# Patient Record
Sex: FEMALE | Race: WHITE | NOT HISPANIC OR LATINO | URBAN - METROPOLITAN AREA
[De-identification: names, ages, dates, MRNs, and addresses within clinical notes are randomized per-mention and may not be internally consistent; named-entity substitution may affect disease eponyms.]

---

## 2017-04-10 RX ORDER — NYSTATIN 100000 U/G
OINTMENT TOPICAL 2 TIMES DAILY
COMMUNITY

## 2017-04-10 RX ORDER — TALC 100 %
POWDER (GRAM) MISCELLANEOUS DAILY PRN
COMMUNITY

## 2017-04-10 RX ORDER — SENNOSIDES 8.6 MG
1 CAPSULE ORAL
COMMUNITY

## 2017-04-10 RX ORDER — POLYETHYLENE GLYCOL 3350 17 G/17G
1 POWDER, FOR SOLUTION ORAL DAILY
COMMUNITY

## 2017-04-10 RX ORDER — MUPIROCIN 20 MG/G
OINTMENT TOPICAL 2 TIMES DAILY
COMMUNITY

## 2017-04-10 RX ORDER — BISACODYL 10 MG
10 SUPPOSITORY, RECTAL RECTAL DAILY PRN
COMMUNITY

## 2017-04-12 ENCOUNTER — ANESTHESIA EVENT (OUTPATIENT)
Dept: SURGERY | Facility: CLINIC | Age: 52
End: 2017-04-12
Payer: MEDICARE

## 2017-04-12 ENCOUNTER — HOSPITAL ENCOUNTER (OUTPATIENT)
Facility: CLINIC | Age: 52
Discharge: HOME | End: 2017-04-12
Attending: DENTIST | Admitting: DENTIST
Payer: MEDICARE

## 2017-04-12 ENCOUNTER — ANESTHESIA (OUTPATIENT)
Dept: SURGERY | Facility: CLINIC | Age: 52
End: 2017-04-12
Payer: MEDICARE

## 2017-04-12 VITALS
BODY MASS INDEX: 25.73 KG/M2 | RESPIRATION RATE: 16 BRPM | WEIGHT: 122.58 LBS | TEMPERATURE: 97.9 F | HEIGHT: 58 IN | DIASTOLIC BLOOD PRESSURE: 58 MMHG | SYSTOLIC BLOOD PRESSURE: 95 MMHG | OXYGEN SATURATION: 98 % | HEART RATE: 79 BPM

## 2017-04-12 DIAGNOSIS — G89.18 POST-OPERATIVE PAIN: Primary | ICD-10-CM

## 2017-04-12 LAB
CREAT SERPL-MCNC: 0.89 MG/DL (ref 0.52–1.04)
GFR SERPL CREATININE-BSD FRML MDRD: 66 ML/MIN/1.7M2
HGB BLD-MCNC: 13.1 G/DL (ref 11.7–15.7)

## 2017-04-12 PROCEDURE — 25000132 ZZH RX MED GY IP 250 OP 250 PS 637: Mod: GY | Performed by: DENTIST

## 2017-04-12 PROCEDURE — 25000125 ZZHC RX 250: Performed by: DENTIST

## 2017-04-12 PROCEDURE — 85018 HEMOGLOBIN: CPT | Performed by: ANESTHESIOLOGY

## 2017-04-12 PROCEDURE — 36000053 ZZH SURGERY LEVEL 2 EA 15 ADDTL MIN - UMMC: Performed by: DENTIST

## 2017-04-12 PROCEDURE — 82565 ASSAY OF CREATININE: CPT | Performed by: ANESTHESIOLOGY

## 2017-04-12 PROCEDURE — 71000027 ZZH RECOVERY PHASE 2 EACH 15 MINS: Performed by: DENTIST

## 2017-04-12 PROCEDURE — 25000566 ZZH SEVOFLURANE, EA 15 MIN: Performed by: DENTIST

## 2017-04-12 PROCEDURE — 93010 ELECTROCARDIOGRAM REPORT: CPT | Performed by: INTERNAL MEDICINE

## 2017-04-12 PROCEDURE — 71000014 ZZH RECOVERY PHASE 1 LEVEL 2 FIRST HR: Performed by: DENTIST

## 2017-04-12 PROCEDURE — 37000008 ZZH ANESTHESIA TECHNICAL FEE, 1ST 30 MIN: Performed by: DENTIST

## 2017-04-12 PROCEDURE — 40000065 ZZH STATISTIC EKG NON-CHARGEABLE

## 2017-04-12 PROCEDURE — 40000170 ZZH STATISTIC PRE-PROCEDURE ASSESSMENT II: Performed by: DENTIST

## 2017-04-12 PROCEDURE — 25000128 H RX IP 250 OP 636: Performed by: NURSE ANESTHETIST, CERTIFIED REGISTERED

## 2017-04-12 PROCEDURE — 25000125 ZZHC RX 250: Performed by: NURSE ANESTHETIST, CERTIFIED REGISTERED

## 2017-04-12 PROCEDURE — 37000009 ZZH ANESTHESIA TECHNICAL FEE, EACH ADDTL 15 MIN: Performed by: DENTIST

## 2017-04-12 PROCEDURE — A9270 NON-COVERED ITEM OR SERVICE: HCPCS | Mod: GY | Performed by: DENTIST

## 2017-04-12 PROCEDURE — 36415 COLL VENOUS BLD VENIPUNCTURE: CPT | Performed by: ANESTHESIOLOGY

## 2017-04-12 PROCEDURE — 36000051 ZZH SURGERY LEVEL 2 1ST 30 MIN - UMMC: Performed by: DENTIST

## 2017-04-12 PROCEDURE — P9041 ALBUMIN (HUMAN),5%, 50ML: HCPCS | Performed by: NURSE ANESTHETIST, CERTIFIED REGISTERED

## 2017-04-12 PROCEDURE — 25800025 ZZH RX 258: Performed by: NURSE ANESTHETIST, CERTIFIED REGISTERED

## 2017-04-12 PROCEDURE — 27210794 ZZH OR GENERAL SUPPLY STERILE: Performed by: DENTIST

## 2017-04-12 RX ORDER — CHLORHEXIDINE GLUCONATE ORAL RINSE 1.2 MG/ML
SOLUTION DENTAL PRN
Status: DISCONTINUED | OUTPATIENT
Start: 2017-04-12 | End: 2017-04-12 | Stop reason: HOSPADM

## 2017-04-12 RX ORDER — SODIUM CHLORIDE, SODIUM LACTATE, POTASSIUM CHLORIDE, CALCIUM CHLORIDE 600; 310; 30; 20 MG/100ML; MG/100ML; MG/100ML; MG/100ML
INJECTION, SOLUTION INTRAVENOUS CONTINUOUS
Status: DISCONTINUED | OUTPATIENT
Start: 2017-04-12 | End: 2017-04-12 | Stop reason: HOSPADM

## 2017-04-12 RX ORDER — SODIUM CHLORIDE, SODIUM LACTATE, POTASSIUM CHLORIDE, CALCIUM CHLORIDE 600; 310; 30; 20 MG/100ML; MG/100ML; MG/100ML; MG/100ML
INJECTION, SOLUTION INTRAVENOUS CONTINUOUS PRN
Status: DISCONTINUED | OUTPATIENT
Start: 2017-04-12 | End: 2017-04-12

## 2017-04-12 RX ORDER — PROPOFOL 10 MG/ML
INJECTION, EMULSION INTRAVENOUS PRN
Status: DISCONTINUED | OUTPATIENT
Start: 2017-04-12 | End: 2017-04-12

## 2017-04-12 RX ORDER — NEOSTIGMINE METHYLSULFATE 1 MG/ML
VIAL (ML) INJECTION PRN
Status: DISCONTINUED | OUTPATIENT
Start: 2017-04-12 | End: 2017-04-12

## 2017-04-12 RX ORDER — CHLORHEXIDINE GLUCONATE ORAL RINSE 1.2 MG/ML
10 SOLUTION DENTAL ONCE
Status: DISCONTINUED | OUTPATIENT
Start: 2017-04-12 | End: 2017-04-12 | Stop reason: HOSPADM

## 2017-04-12 RX ORDER — FENTANYL CITRATE 50 UG/ML
INJECTION, SOLUTION INTRAMUSCULAR; INTRAVENOUS PRN
Status: DISCONTINUED | OUTPATIENT
Start: 2017-04-12 | End: 2017-04-12

## 2017-04-12 RX ORDER — ALBUMIN, HUMAN INJ 5% 5 %
SOLUTION INTRAVENOUS CONTINUOUS PRN
Status: DISCONTINUED | OUTPATIENT
Start: 2017-04-12 | End: 2017-04-12

## 2017-04-12 RX ORDER — BACITRACIN ZINC 500 [USP'U]/G
OINTMENT TOPICAL PRN
Status: DISCONTINUED | OUTPATIENT
Start: 2017-04-12 | End: 2017-04-12 | Stop reason: HOSPADM

## 2017-04-12 RX ORDER — ONDANSETRON 2 MG/ML
INJECTION INTRAMUSCULAR; INTRAVENOUS PRN
Status: DISCONTINUED | OUTPATIENT
Start: 2017-04-12 | End: 2017-04-12

## 2017-04-12 RX ORDER — ACETAMINOPHEN 500 MG
500 TABLET ORAL EVERY 4 HOURS PRN
Qty: 24 TABLET | Refills: 0 | Status: SHIPPED | OUTPATIENT
Start: 2017-04-12 | End: 2017-04-16

## 2017-04-12 RX ORDER — EPHEDRINE SULFATE 50 MG/ML
INJECTION, SOLUTION INTRAMUSCULAR; INTRAVENOUS; SUBCUTANEOUS PRN
Status: DISCONTINUED | OUTPATIENT
Start: 2017-04-12 | End: 2017-04-12

## 2017-04-12 RX ORDER — LIDOCAINE HYDROCHLORIDE AND EPINEPHRINE 10; 10 MG/ML; UG/ML
INJECTION, SOLUTION INFILTRATION; PERINEURAL PRN
Status: DISCONTINUED | OUTPATIENT
Start: 2017-04-12 | End: 2017-04-12 | Stop reason: HOSPADM

## 2017-04-12 RX ORDER — GLYCOPYRROLATE 0.2 MG/ML
INJECTION, SOLUTION INTRAMUSCULAR; INTRAVENOUS PRN
Status: DISCONTINUED | OUTPATIENT
Start: 2017-04-12 | End: 2017-04-12

## 2017-04-12 RX ORDER — DEXAMETHASONE SODIUM PHOSPHATE 4 MG/ML
INJECTION, SOLUTION INTRA-ARTICULAR; INTRALESIONAL; INTRAMUSCULAR; INTRAVENOUS; SOFT TISSUE PRN
Status: DISCONTINUED | OUTPATIENT
Start: 2017-04-12 | End: 2017-04-12

## 2017-04-12 RX ADMIN — Medication 5 MG: at 17:23

## 2017-04-12 RX ADMIN — PHENYLEPHRINE HYDROCHLORIDE 50 MCG: 10 INJECTION, SOLUTION INTRAMUSCULAR; INTRAVENOUS; SUBCUTANEOUS at 16:59

## 2017-04-12 RX ADMIN — Medication 5 MG: at 16:53

## 2017-04-12 RX ADMIN — FENTANYL CITRATE 25 MCG: 50 INJECTION, SOLUTION INTRAMUSCULAR; INTRAVENOUS at 17:34

## 2017-04-12 RX ADMIN — DEXAMETHASONE SODIUM PHOSPHATE 8 MG: 4 INJECTION, SOLUTION INTRAMUSCULAR; INTRAVENOUS at 16:06

## 2017-04-12 RX ADMIN — Medication 5 MG: at 17:20

## 2017-04-12 RX ADMIN — Medication 5 MG: at 16:38

## 2017-04-12 RX ADMIN — SODIUM CHLORIDE, POTASSIUM CHLORIDE, SODIUM LACTATE AND CALCIUM CHLORIDE: 600; 310; 30; 20 INJECTION, SOLUTION INTRAVENOUS at 15:39

## 2017-04-12 RX ADMIN — FENTANYL CITRATE 25 MCG: 50 INJECTION, SOLUTION INTRAMUSCULAR; INTRAVENOUS at 17:12

## 2017-04-12 RX ADMIN — ONDANSETRON 4 MG: 2 INJECTION INTRAMUSCULAR; INTRAVENOUS at 17:32

## 2017-04-12 RX ADMIN — Medication 5 MG: at 16:57

## 2017-04-12 RX ADMIN — GLYCOPYRROLATE 0.4 MG: 0.2 INJECTION, SOLUTION INTRAMUSCULAR; INTRAVENOUS at 17:36

## 2017-04-12 RX ADMIN — Medication 30 MG: at 15:47

## 2017-04-12 RX ADMIN — Medication 5 MG: at 16:29

## 2017-04-12 RX ADMIN — SODIUM CHLORIDE, POTASSIUM CHLORIDE, SODIUM LACTATE AND CALCIUM CHLORIDE: 600; 310; 30; 20 INJECTION, SOLUTION INTRAVENOUS at 16:45

## 2017-04-12 RX ADMIN — MIDAZOLAM HYDROCHLORIDE 2 MG: 1 INJECTION, SOLUTION INTRAMUSCULAR; INTRAVENOUS at 15:39

## 2017-04-12 RX ADMIN — PROPOFOL 30 MG: 10 INJECTION, EMULSION INTRAVENOUS at 16:44

## 2017-04-12 RX ADMIN — NEOSTIGMINE METHYLSULFATE 2 MG: 1 INJECTION INTRAMUSCULAR; INTRAVENOUS; SUBCUTANEOUS at 17:36

## 2017-04-12 RX ADMIN — ALBUMIN (HUMAN): 12.5 SOLUTION INTRAVENOUS at 17:12

## 2017-04-12 RX ADMIN — Medication 5 MG: at 16:35

## 2017-04-12 RX ADMIN — PROPOFOL 10 MG: 10 INJECTION, EMULSION INTRAVENOUS at 17:12

## 2017-04-12 RX ADMIN — FENTANYL CITRATE 50 MCG: 50 INJECTION, SOLUTION INTRAMUSCULAR; INTRAVENOUS at 15:45

## 2017-04-12 RX ADMIN — PHENYLEPHRINE HYDROCHLORIDE 50 MCG: 10 INJECTION, SOLUTION INTRAMUSCULAR; INTRAVENOUS; SUBCUTANEOUS at 16:06

## 2017-04-12 RX ADMIN — PHENYLEPHRINE HYDROCHLORIDE 50 MCG: 10 INJECTION, SOLUTION INTRAMUSCULAR; INTRAVENOUS; SUBCUTANEOUS at 17:23

## 2017-04-12 ASSESSMENT — ENCOUNTER SYMPTOMS: SEIZURES: 1

## 2017-04-12 NOTE — IP AVS SNAPSHOT
[address] [address]    Phone:  [phone]                                       After Visit Summary   4/12/2017    Azalia Pimentel    MRN: [id number]           After Visit Summary Signature Page     I have received my discharge instructions, and my questions have been answered. I have discussed any challenges I see with this plan with the nurse or doctor.    ..........................................................................................................................................  Patient/Patient Representative Signature      ..........................................................................................................................................  Patient Representative Print Name and Relationship to Patient    ..................................................               ................................................  Date                                            Time    ..........................................................................................................................................  Reviewed by Signature/Title    ...................................................              ..............................................  Date                                                            Time

## 2017-04-12 NOTE — DISCHARGE INSTRUCTIONS
[facility]  Same-Day Surgery   Adult Discharge Orders & Instructions     For 24 hours after surgery    1. Get plenty of rest.  A responsible adult must stay with you for at least 24 hours after you leave the hospital.   2. Do not drive or use heavy equipment.  If you have weakness or tingling, don't drive or use heavy equipment until this feeling goes away.  3. Do not drink alcohol.  4. Avoid strenuous or risky activities.  Ask for help when climbing stairs.   5. You may feel lightheaded.  IF so, sit for a few minutes before standing.  Have someone help you get up.   6. If you have nausea (feel sick to your stomach): Drink only clear liquids such as apple juice, ginger ale, broth or 7-Up.  Rest may also help.  Be sure to drink enough fluids.  Move to a regular diet as you feel able.  7. You may have a slight fever. Call the doctor if your fever is over 100 F (37.7 C) (taken under the tongue) or lasts longer than 24 hours.  8. You may have a dry mouth, a sore throat, muscle aches or trouble sleeping.  These should go away after 24 hours.  9. Do not make important or legal decisions.   Call your doctor for any of the followin.  Signs of infection (fever, growing tenderness at the surgery site, a large amount of drainage or bleeding, severe pain, foul-smelling drainage, redness, swelling).    2. It has been over 8 to 10 hours since surgery and you are still not able to urinate (pass water).    3.  Headache for over 24 hours.    4.  Numbness, tingling or weakness the day after surgery (if you had spinal anesthesia).  To contact a doctor, call ________________________________________ or:        [phone] and ask for the resident on call for   ______________________________________________ (answered 24 hours a day)      Emergency Department:    [facility]: [phone]       (TTY for hearing impaired: [phone])    [facility]: [phone]       (TTY for  hearing impaired: [phone])

## 2017-04-12 NOTE — ANESTHESIA CARE TRANSFER NOTE
Patient: Azalia Pimentel    Procedure(s):  Dental Exam, Radiographs, Periodontal Therapy, and Extractions x2, Flouride Varnish - Wound Class: II-Clean Contaminated    Diagnosis: Dental Caries, Periodontal Disease   Diagnosis Additional Information: No value filed.    Anesthesia Type:   General     Note:  Airway :Face Mask  Patient transferred to:PACU        Vitals: (Last set prior to Anesthesia Care Transfer)    CRNA VITALS  4/12/2017 1718 - 4/12/2017 1753      4/12/2017             Pulse: 95    Ht Rate: 98    SpO2: 100 %    Resp Rate (observed): (!)  6                Electronically Signed By: BUCKY Talbot CRNA  April 12, 2017  5:53 PM

## 2017-04-12 NOTE — BRIEF OP NOTE
[facility], [place]    Brief Operative Note    Pre-operative diagnosis: Dental Caries, Periodontal Disease   Post-operative diagnosis * No post-op diagnosis entered *  Procedure: Procedure(s):  Dental Exam, Radiographs, Periodontal Therapy, and Extractions x2, Flouride Varnish - Wound Class: II-Clean Contaminated  Surgeon: Surgeon(s) and Role:     * Pratima Cooper DDS - Primary     * Matias Arrieta DDS - Resident - Assisting  Anesthesia: General   Estimated blood loss: Less than 50 ml  Drains: None  Specimens: * No specimens in log *  Findings:   None.  Complications: None.  Implants: None.

## 2017-04-12 NOTE — ANESTHESIA POSTPROCEDURE EVALUATION
Patient: Azalia Pimentel    Procedure(s):  Dental Exam, Radiographs, Periodontal Therapy, and Extractions x2, Flouride Varnish - Wound Class: II-Clean Contaminated    Diagnosis:Dental Caries, Periodontal Disease   Diagnosis Additional Information: No value filed.    Anesthesia Type:  General    Note:  Anesthesia Post Evaluation    Patient location during evaluation: PACU  Patient participation: Able to fully participate in evaluation  Level of consciousness: awake  Pain management: adequate  Airway patency: patent  Cardiovascular status: acceptable  Respiratory status: acceptable  Hydration status: balanced  PONV: none     Anesthetic complications: None          Last vitals:  Vitals:    04/12/17 1753 04/12/17 1800 04/12/17 1815   BP: 106/41 108/52 97/47   Pulse:      Resp: 24 14 13   Temp: 36.6  C (97.9  F)  36.6  C (97.9  F)   SpO2: 100% 100% 99%         Electronically Signed By: Jakub Ansari MD  April 12, 2017  6:50 PM

## 2017-04-12 NOTE — ANESTHESIA PREPROCEDURE EVALUATION
Anesthesia Evaluation     . Pt has had prior anesthetic. Type: General           ROS/MED HX    ENT/Pulmonary:     (+)sleep apnea, uses CPAP , . .    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    Cardiovascular:  - neg cardiovascular ROS       METS/Exercise Tolerance:     Hematologic:         Musculoskeletal:  - neg musculoskeletal ROS       GI/Hepatic: Comment: H/0 dysphagia    (+) GERD       Renal/Genitourinary:     (+) Other Renal/ Genitourinary, h/o pyelonephritis      Endo:  - neg endo ROS       Psychiatric:  - neg psychiatric ROS       Infectious Disease:  - neg infectious disease ROS       Malignancy:         Other: Comment: Postmenopausal                    Physical Exam  Normal systems: cardiovascular and pulmonary    Airway   Mallampati: I  TM distance: >3 FB  Neck ROM: full    Dental     Cardiovascular       Pulmonary     Other findings:   Airway - small oral opening, large neck                Anesthesia Plan      History & Physical Review  History and physical reviewed and following examination; no interval change.    ASA Status:  3 .    NPO Status:  > 8 hours    Plan for General with Propofol induction. Maintenance will be Balanced.    PONV prophylaxis:  Ondansetron (or other 5HT-3) and Dexamethasone or Solumedrol  Additional equipment: Videolaryngoscope               S/P Dental (09/28/2015) GETA without incident.  ETT 6.5 with [facility]      Labs           Cr   0.89         Hg   13.1    EKG - NSR @ 69 bpm      Plan  - GETA with CMAC assistance.    Risks and possible complications of GETA discussed in full with pt's caretaker.  She voices understanding and wishes to proceed.    Dr. Mariela Womack MD  Anesthesia  04/12/2017 @ 1145 am      Postoperative Care  Postoperative pain management:  IV analgesics.      Consents  Anesthetic plan, risks, benefits and alternatives discussed with:   Healthcare Power of ..                          .

## 2017-04-12 NOTE — OR NURSING
Azalia's caregiver said that pt's legal guardian Kristal has a meeting coming up soon and it will be harder to step out of to get phone consent.  Kristal had been notified that dentist would call around 12 but they are running behing.  Dr. Cooper and OR nurse notified and they said would call for phone consent now from the OR.

## 2017-04-12 NOTE — IP AVS SNAPSHOT
MRN:[id number]                      After Visit Summary   4/12/2017    Azalia Pimentel    MRN: [id number]           Thank you!     Thank you for choosing [facility] for your care. Our goal is always to provide you with excellent care. Hearing back from our patients is one way we can continue to improve our services. Please take a few minutes to complete the written survey that you may receive in the mail after you visit with us. Thank you!        Patient Information     Date Of Birth          1965        About your hospital stay     You were admitted on:  April 12, 2017 You last received care in the:  [facility] PACU    You were discharged on:  April 12, 2017       Who to Call     For medical emergencies, please call 911.  For non-urgent questions about your medical care, please call your primary care provider or clinic, [phone]  For questions related to your surgery, please call your surgery clinic        Attending Provider     Provider Pratima Landin DDS Dentist       Primary Care Provider Office Phone # Fax #    Khadra Huynh [phone]       [address] [address]        After Care Instructions     Discharge Instructions       Return to [facility] dental clinic in 6 months for recall and follow up.  Call the dental clinic to schedule the appointment.  Clinic phone: [phone]  Emergency post op care (after business hours and weekends) call: [phone], and ask for the dental resident on call.    Procedures Performed Today (April 12, 2017): Dental exam, dental radiographs, scaling and root planing, prophylaxis, extractions of one molar in the upper right and one in the lower left. No cavities were found. Periodontal abscess was noted associated with a molar in the upper right. Tooth was removed due to extensive bone loss and poor prognosis.  Fluoride varnish was applied.       Post-operative oral  surgery instructions  Care of the mouth following a surgical procedure is essential in the healing process.  There is a certain amount of swelling, discoloration, discomfort and bleeding which can be expected.     Swelling:  Some degree of swelling is normal and can be minimized with the use of ice or cold packs applied to the area for 15-20 minutes and then removed for 15-20 minutes.  This should only be done for the first 24 hours, after 24 hours use a warm moist compress over the area for 15-20 minutes and then remove for 15-20 minutes.  Sit upright and keep your head elevated when sleeping.  Maximum swelling will occur about the second or third post-operative day and then slowly recede. Once present, it can remain swollen for up to 7 days and discomfort may persist for 10 days.    Discomfort:   A variable amount of pain follows extraction and oral surgery procedures. Tylenol, ibuprofen, or any over the counter pain medication can be used. In some cases, prescription pain medication will be given which  should be taken exactly as directed, and taken before the local anesthetic wears off. If pain increases for more than 3 days call the clinic.   Do not take pain medication on an empty stomach as it may cause nausea.     Bleeding:  Some bleeding and oozing is to be expected for several hours.  Avoid spitting, rinsing, swishing, and use of a straw for the next 24-48 hours, as they may provoke oozing.  If bleeding is visible then place a moistened gauze pack over the area and keep firm pressure on the gauze pack for 30 minutes and then discard.  If bleeding is more than slight, use sterile gauze or a moistened tea bag over the area and again apply firm pressure for 30 minutes.     Discoloration:   Facial discoloration (black and blue bruising) often follows many extractions and oral surgery procedures. Discoloration is normal and is no cause for alarm. It may persist for as long as several weeks.    Jaw Stiffness:    For several days following most oral surgical procedures, the jaw may become somewhat stiff. Should jaw stiffness worsen after 2  weeks, call the clinic.    Numbness:    Local anesthetics may be effective for approximately 24-48 hours. If you are numb beyond this time frame, please call the clinic.    Nausea:    Nausea is common after surgery, and it is sometimes caused by pain medicines. Nausea may be reduced by preceding each pill with a small amount of soft food, then taking the pill with a large volume of water. Continue consuming clear fluids and minimize the pain medication. Call if you don t feel better or if vomiting is a problem. Soft drinks that have less carbonation may help with nausea.    Care of the mouth:  Do not rinse your mouth for 24 hours after surgery.  A day following surgery or extraction rinse with warm salt water after each meal or 3-4 times a day (One half teaspoonful of table salt in a full glass of warm water). Resume normal oral hygiene within 24 hours after procedure. Do not brush surgical or extraction site but clean your teeth within the bounds of comfort.   Avoid use of alcohol, smoking, or carbonated drinks for 24-48 hours after surgery.  This may interfere with clot formation and slow the healing process.  Smoking is the primary cause of dry sockets.    Dry Socket:   This is a condition where the wound healing is disturbed or altered after removal of a tooth. This usually occurs within 3-10 days after removal of a tooth. Typically pain will increase and worsen, and may radiate along the jaw and into the ear. Call the clinic if you experience these symptoms.    Diet:  A soft or liquid diet is recommended for the first few days following surgery, advance as tolerated. Until local anesthesia (numbness) wears off, be careful chewing to prevent biting the numb area. Eat soft and liquid foods such as yogurt, cottage cheese, soup etc. Try not to skip a meal, and keep up normal  diet.    Rest:  Rest as much as possible for the next 24 hours, avoiding any excessive amount of physical activity.       Fluoride Varnish:  A 5% sodium fluoride varnish was placed over the teeth for the prevention of dental decay.  The varnish hardens on contact with saliva so the teeth may appear spotty or as if there is a thin film coating the teeth, this is normal.  The varnish should remain on your teeth for at least 4-6 hours for the maximum effect.  It is recommended that you only eat soft foods and drink cold liquids during this time, do not eat or drink anything hot and do not brush your teeth the day of your surgery.  The varnish with naturally wear away, and can be brushed off the next day.     Note:  1. Return to work or school in 24-48 hours  2. Antibiotics may decrease the effectiveness of birth control medications.  Additional methods of birthcontrol should be used while on antibiotics.                  Further instructions from your care team       [facility], [place]  Same-Day Surgery   Adult Discharge Orders & Instructions     For 24 hours after surgery    1. Get plenty of rest.  A responsible adult must stay with you for at least 24 hours after you leave the hospital.   2. Do not drive or use heavy equipment.  If you have weakness or tingling, don't drive or use heavy equipment until this feeling goes away.  3. Do not drink alcohol.  4. Avoid strenuous or risky activities.  Ask for help when climbing stairs.   5. You may feel lightheaded.  IF so, sit for a few minutes before standing.  Have someone help you get up.   6. If you have nausea (feel sick to your stomach): Drink only clear liquids such as apple juice, ginger ale, broth or 7-Up.  Rest may also help.  Be sure to drink enough fluids.  Move to a regular diet as you feel able.  7. You may have a slight fever. Call the doctor if your fever is over 100 F (37.7 C) (taken under the tongue) or lasts longer than 24  "hours.  8. You may have a dry mouth, a sore throat, muscle aches or trouble sleeping.  These should go away after 24 hours.  9. Do not make important or legal decisions.   Call your doctor for any of the followin.  Signs of infection (fever, growing tenderness at the surgery site, a large amount of drainage or bleeding, severe pain, foul-smelling drainage, redness, swelling).    2. It has been over 8 to 10 hours since surgery and you are still not able to urinate (pass water).    3.  Headache for over 24 hours.    4.  Numbness, tingling or weakness the day after surgery (if you had spinal anesthesia).  To contact a doctor, call ________________________________________ or:        [phone] and ask for the resident on call for   ______________________________________________ (answered 24 hours a day)      Emergency Department:    [facility]: [phone]       (TTY for hearing impaired: [phone])    [facility]: [phone]       (TTY for hearing impaired: [phone])        Pending Results     Date and Time Order Name Status Description    2017 1130 EKG 12-lead, tracing only Preliminary             Admission Information     Date & Time Provider Department Dept. Phone    2017 Pratima Cooper, [facility] [place] PACU [phone]      Your Vitals Were     Blood Pressure Pulse Temperature Respirations Height Weight    108/52 79 97.9  F (36.6  C) (Oral) 14 1.473 m (4' 10\") 55.6 kg (122 lb 9.2 oz)    Pulse Oximetry BMI (Body Mass Index)                100% 25.62 kg/m2          MyChart Information     [facility] lets you send messages to your doctor, view your test results, renew your prescriptions, schedule appointments and more. To sign up, go to [email or web address] . Click on \"Log in\" on the left side of the screen, which will take you to the Welcome page. Then click on \"Sign up Now\" on the right side of the page.     You will be asked to enter the access code listed below, " as well as some personal information. Please follow the directions to create your username and password.     Your access code is: [email or web address]  Expires: 2017  6:13 PM     Your access code will  in 90 days. If you need help or a new code, please call your [facility] or [phone].        Care EveryWhere ID     This is your Care EveryWhere ID. This could be used by other organizations to access your [facility] medical records  [id number]           Review of your medicines      START taking        Dose / Directions    acetaminophen 500 MG tablet   Commonly known as:  TYLENOL   Used for:  Post-operative pain        Dose:  500 mg   Take 1 tablet (500 mg) by mouth every 4 hours as needed for other (post operative discomfort)   Quantity:  24 tablet   Refills:  0         CONTINUE these medicines which have NOT CHANGED        Dose / Directions    ascorbic acid 1000 MG Tabs tablet        Dose:  250 mg   Take 250 mg by mouth daily   Refills:  0       ATORVASTATIN CALCIUM PO        Dose:  20 mg   Take 20 mg by mouth daily   Refills:  0       bisacodyl 10 MG Suppository   Commonly known as:  DULCOLAX        Dose:  10 mg   Place 10 mg rectally daily as needed for constipation   Refills:  0       carbamide peroxide 6.5 % otic solution   Commonly known as:  DEBROX        Dose:  5 drop   5 drops once a week   Refills:  0       cetirizine 10 MG tablet   Commonly known as:  zyrTEC        Dose:  10 mg   Take 10 mg by mouth   Refills:  0       Cranberry 500 MG Caps        Take by mouth daily   Refills:  0       levETIRAcetam 750 MG tablet   Commonly known as:  KEPPRA        Dose:  500 mg   Take 500 mg by mouth   Refills:  0       loperamide 2 MG capsule   Commonly known as:  IMODIUM        Refills:  0       mupirocin 2 % ointment   Commonly known as:  BACTROBAN        Apply topically 2 times daily   Refills:  0       nystatin ointment   Commonly known as:  MYCOSTATIN        Apply topically 2 times daily   Refills:  0        OMEPRAZOLE PO        Dose:  20 mg   Take 20 mg by mouth every morning   Refills:  0       OSCAL 500/200 D-3 500-200 MG-UNIT per tablet   Generic drug:  calcium carb 1250 mg (500 mg [place])/vitamin D 200 unit        Refills:  0       polyethylene glycol Packet   Commonly known as:  MIRALAX/GLYCOLAX        Dose:  1 packet   Take 1 packet by mouth daily   Refills:  0       PREVIDENT 1.1 % Gel topical gel   Generic drug:  sodium fluoride dental gel        Refills:  0       Psyllium 50 % Powd        Refills:  0       Selenium Sulf-Pyrithione-Urea 2.25 % Sham        Dose:  1 Application   Externally apply 1 Application topically daily   Refills:  0       Senna 8.6 MG Caps        Dose:  1 tablet   Take 1 tablet by mouth 2 times daily   Refills:  0       talc Powd powder        daily as needed   Refills:  0            Where to get your medicines      Some of these will need a paper prescription and others can be bought over the counter. Ask your nurse if you have questions.     Bring a paper prescription for each of these medications     acetaminophen 500 MG tablet                Protect others around you: Learn how to safely use, store and throw away your medicines at www.disposemymeds.org.             Medication List: This is a list of all your medications and when to take them. Check marks below indicate your daily home schedule. Keep this list as a reference.      Medications           Morning Afternoon Evening Bedtime As Needed    acetaminophen 500 MG tablet   Commonly known as:  TYLENOL   Take 1 tablet (500 mg) by mouth every 4 hours as needed for other (post operative discomfort)                                ascorbic acid 1000 MG Tabs tablet   Take 250 mg by mouth daily                                ATORVASTATIN CALCIUM PO   Take 20 mg by mouth daily                                bisacodyl 10 MG Suppository   Commonly known as:  DULCOLAX   Place 10 mg rectally daily as needed for constipation                                 carbamide peroxide 6.5 % otic solution   Commonly known as:  DEBROX   5 drops once a week                                cetirizine 10 MG tablet   Commonly known as:  zyrTEC   Take 10 mg by mouth                                Cranberry 500 MG Caps   Take by mouth daily                                levETIRAcetam 750 MG tablet   Commonly known as:  KEPPRA   Take 500 mg by mouth                                loperamide 2 MG capsule   Commonly known as:  IMODIUM                                mupirocin 2 % ointment   Commonly known as:  BACTROBAN   Apply topically 2 times daily                                nystatin ointment   Commonly known as:  MYCOSTATIN   Apply topically 2 times daily                                OMEPRAZOLE PO   Take 20 mg by mouth every morning                                OSCAL 500/200 D-3 500-200 MG-UNIT per tablet   Generic drug:  calcium carb 1250 mg (500 mg [place])/vitamin D 200 unit                                polyethylene glycol Packet   Commonly known as:  MIRALAX/GLYCOLAX   Take 1 packet by mouth daily                                PREVIDENT 1.1 % Gel topical gel   Generic drug:  sodium fluoride dental gel                                Psyllium 50 % Powd                                Selenium Sulf-Pyrithione-Urea 2.25 % Sham   Externally apply 1 Application topically daily                                Senna 8.6 MG Caps   Take 1 tablet by mouth 2 times daily                                talc Powd powder   daily as needed

## 2017-04-13 LAB — INTERPRETATION ECG - MUSE: NORMAL

## 2017-04-13 NOTE — OP NOTE
DATE OF PROCEDURE:  04/12/2017      It was deemed necessary for this patient to be seen in the hospital operating room due to Down syndrome, dementia, developmental delay and inability to be treated in a traditional dental clinic setting.      PROCEDURES:  Under general anesthesia, the following operations were performed in the mouth:   1.  Bilateral dental examination.   2.  Dental radiographs.   3.  Prophylaxis.     4.  Scaling and root planing.     5.  Dental extractions x2.   6.  Fluoride varnish application.      ATTENDING PHYSICIAN:  Pratima Cooper DDS      FIRST ASSISTANT DENTAL RESIDENT:  Wilfrido Arrieta DDS      ANESTHESIOLOGIST:  Jakub Ansari MD      SCRUB NURSE:  Seble MADISON NURSE:  Marina      CRNA:  Dasha      PREOPERATIVE DIAGNOSIS:  Suspected periodontal disease and dental caries.      POSTOPERATIVE DIAGNOSES:  Chronic generalized gingivitis and localized severe periodontitis.      DESCRIPTION OF PROCEDURE:  Azalia Pimentel was brought into the operating room and draped in the usual customary [facility] [place] fashion.  Following the timeout procedures, general anesthesia was administered through the patient's right naris.  A bilateral dental exam was performed and radiographs of 7 periapical and 0 bitewing radiographs were obtained and interpreted.  A moist throat pack was placed at 1642.  Clinical examination revealed generalized light plaque and subgingival calculus, generalized bleeding on probing and periodontal pockets ranging from 2-14 mm.  Radiographic examination revealed normal bone trabeculation and generalized horizontal bone loss with localized severe bone loss.  The local anesthetic used was 1% lidocaine with 1:100,000 epinephrine.  The total amount dispersed was 3 mL.        The following procedures were performed:  Periodontal therapy was performed on all teeth using ultrasonic debridement, supragingival and subgingival scaling and root planing with  rubber cup polishing and flossing.  Nonsurgical extractions were performed on the maxillary right first molar and the mandibular left second molar.  Fluoride varnish was applied to all teeth.        The throat pack was removed with suction at 1736.  The oropharynx was inspected and found to be clear.  Estimated blood loss was 7 mL.      The attending doctor, Dr. Pratima Cooper, was present for the entire procedure.  The patient was extubated in the operating room and taken to the post-anesthesia care unit in good condition.         PRATIMA COOPER DDS       As dictated by LUCILA ARROYO DDS            D: 2017 20:51   T: 2017 05:01   MT: pepe      Name:     MAXIMILIAN BURCIAGA   MRN:      [id number]        Account:        [id number]   :      1965           Procedure Date: 2017      Document: [id number]

## 2021-05-26 ENCOUNTER — RECORDS - HEALTHEAST (OUTPATIENT)
Dept: ADMINISTRATIVE | Facility: CLINIC | Age: 56
End: 2021-05-26

## 2021-06-01 ENCOUNTER — RECORDS - HEALTHEAST (OUTPATIENT)
Dept: ADMINISTRATIVE | Facility: CLINIC | Age: 56
End: 2021-06-01

## 2021-06-02 ENCOUNTER — RECORDS - HEALTHEAST (OUTPATIENT)
Dept: ADMINISTRATIVE | Facility: CLINIC | Age: 56
End: 2021-06-02

## (undated) DEVICE — TUBING SUCTION MEDI-VAC 1/4"X20' N620A

## (undated) DEVICE — BRUSH SURGICAL SCRUB PLAIN STERILE 4454A

## (undated) DEVICE — LINEN ORTHO PACK 5446

## (undated) DEVICE — STRAP KNEE/BODY 31143004

## (undated) DEVICE — GLOVE RADIATION RESISTANT SZ 7  95-394

## (undated) DEVICE — SUCTION TIP YANKAUER W/O VENT K86

## (undated) DEVICE — PREP POVIDONE IODINE SWABSTICKS TRIPLE PACK MDS093902A

## (undated) DEVICE — DECANTER TRANSFER DEVICE 2008S

## (undated) DEVICE — ANTIFOG SOLUTION W/FOAM PAD 31142527

## (undated) DEVICE — SOL NACL 0.9% IRRIG 1000ML BOTTLE 2F7124

## (undated) DEVICE — SYR EAR BULB 3OZ 0035830

## (undated) DEVICE — SURGICEL HEMOSTAT 2X3" 1953

## (undated) DEVICE — PREP POVIDONE IODINE SOLUTION 10% 120ML

## (undated) DEVICE — SUCTION CANISTER MEDIVAC LINER 1500ML W/LID 65651-515

## (undated) DEVICE — GLOVE PROTEXIS W/NEU-THERA 6.5  2D73TE65

## (undated) DEVICE — RX BACITRACIN OINTMENT 0.9G 1/32OZ 01680 11109

## (undated) DEVICE — LINEN GOWN X4 5410

## (undated) DEVICE — SOL WATER IRRIG 1000ML BOTTLE 2F7114

## (undated) DEVICE — COVER PROBE ULTRASOUND 3D W/GEL 5X96" LF 20-P3D596

## (undated) DEVICE — Device

## (undated) DEVICE — TOOTHBRUSH ADULT NON STERILE MDS136850